# Patient Record
Sex: MALE | Race: WHITE
[De-identification: names, ages, dates, MRNs, and addresses within clinical notes are randomized per-mention and may not be internally consistent; named-entity substitution may affect disease eponyms.]

---

## 2017-05-01 ENCOUNTER — HOSPITAL ENCOUNTER (EMERGENCY)
Dept: HOSPITAL 25 - ED | Age: 32
Discharge: HOME | End: 2017-05-01
Payer: COMMERCIAL

## 2017-05-01 VITALS — DIASTOLIC BLOOD PRESSURE: 64 MMHG | SYSTOLIC BLOOD PRESSURE: 106 MMHG
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
## 2017-05-01 DIAGNOSIS — J02.9: ICD-10-CM

## 2017-05-01 DIAGNOSIS — R06.2: ICD-10-CM

## 2017-05-01 DIAGNOSIS — M54.2: ICD-10-CM

## 2017-05-01 DIAGNOSIS — R55: ICD-10-CM

## 2017-05-01 DIAGNOSIS — S09.90XA: Primary | ICD-10-CM
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
## 2017-05-01 DIAGNOSIS — R51: ICD-10-CM

## 2017-05-01 DIAGNOSIS — J32.9: ICD-10-CM
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
## 2017-05-01 LAB
ALBUMIN SERPL BCG-MCNC: 4.2 G/DL (ref 3.2–5.2)
ALP SERPL-CCNC: 83 U/L (ref 34–104)
ALT SERPL W P-5'-P-CCNC: 41 U/L (ref 7–52)
ANION GAP SERPL CALC-SCNC: 4 MMOL/L (ref 2–11)
AST SERPL-CCNC: 27 U/L (ref 13–39)
BUN SERPL-MCNC: 16 MG/DL (ref 6–24)
BUN/CREAT SERPL: 16.7 (ref 8–20)
CALCIUM SERPL-MCNC: 9.7 MG/DL (ref 8.6–10.3)
CHLORIDE SERPL-SCNC: 102 MMOL/L (ref 101–111)
GLOBULIN SER CALC-MCNC: 2.8 G/DL (ref 2–4)
GLUCOSE SERPL-MCNC: 105 MG/DL (ref 70–100)
HCO3 SERPL-SCNC: 29 MMOL/L (ref 22–32)
HCT VFR BLD AUTO: 45 % (ref 42–52)
HGB BLD-MCNC: 14.8 G/DL (ref 14–18)
LIPASE SERPL-CCNC: < 10 U/L (ref 11–82)
MAGNESIUM SERPL-MCNC: 2 MG/DL (ref 1.9–2.7)
MCH RBC QN AUTO: 33 PG (ref 27–31)
MCHC RBC AUTO-ENTMCNC: 33 G/DL (ref 31–36)
MCV RBC AUTO: 100 FL (ref 80–94)
POTASSIUM SERPL-SCNC: 4.4 MMOL/L (ref 3.5–5)
PROT SERPL-MCNC: 7 G/DL (ref 6.4–8.9)
RBC # BLD AUTO: 4.45 10^6/UL (ref 4–5.4)
SODIUM SERPL-SCNC: 135 MMOL/L (ref 133–145)
TSH SERPL-ACNC: 0.98 MCIU/ML (ref 0.34–5.6)
WBC # BLD AUTO: 9.7 10^3/UL (ref 3.5–10.8)

## 2017-05-01 PROCEDURE — 72125 CT NECK SPINE W/O DYE: CPT

## 2017-05-01 PROCEDURE — 85730 THROMBOPLASTIN TIME PARTIAL: CPT

## 2017-05-01 PROCEDURE — 83690 ASSAY OF LIPASE: CPT

## 2017-05-01 PROCEDURE — 84443 ASSAY THYROID STIM HORMONE: CPT

## 2017-05-01 PROCEDURE — 70450 CT HEAD/BRAIN W/O DYE: CPT

## 2017-05-01 PROCEDURE — 85610 PROTHROMBIN TIME: CPT

## 2017-05-01 PROCEDURE — 99285 EMERGENCY DEPT VISIT HI MDM: CPT

## 2017-05-01 PROCEDURE — 86140 C-REACTIVE PROTEIN: CPT

## 2017-05-01 PROCEDURE — 85025 COMPLETE CBC W/AUTO DIFF WBC: CPT

## 2017-05-01 PROCEDURE — 83735 ASSAY OF MAGNESIUM: CPT

## 2017-05-01 PROCEDURE — 80053 COMPREHEN METABOLIC PANEL: CPT

## 2017-05-01 PROCEDURE — 36415 COLL VENOUS BLD VENIPUNCTURE: CPT

## 2017-05-01 NOTE — RAD
INDICATION:  Trauma, neck pain.



COMPARISON:  Comparison is made with a prior CT of the cervical spine from April 14, 2011

and a prior MRI of the cervical spine from October 25, 2012.



TECHNIQUE: Contiguous axial sections were obtained from the skull base through the T2

vertebra. Images were reconstructed in the sagittal and coronal planes.



FINDINGS: There is a mild cervical scoliosis convex toward the left side. The vertebra are

otherwise in normal alignment. No prevertebral soft tissue swelling or fracture is seen.



No significant spinal canal narrowing is seen. The intervertebral disc spaces appear

maintained.



IMPRESSION:  NO EVIDENCE FOR FRACTURE OR SUBLUXATION.

## 2017-05-01 NOTE — RAD
INDICATION:  Syncope, head injury.



COMPARISON:  Comparison is made with a prior CT of the brain from September 07, 2012.



TECHNIQUE: Contiguous axial sections of the brain were obtained from the skull base to the

vertex without contrast.



FINDINGS: The ventricles, cisterns and sulci are within normal limits.  No significant

focal abnormality or mass effect is seen. There is no evidence for hemorrhage.



No significant focal osseous abnormality is seen. The visualized portion of the paranasal

sinuses and mastoid air cells appear clear.



IMPRESSION:  NO EVIDENCE FOR ACUTE INTRACRANIAL ABNORMALITY.

## 2017-05-01 NOTE — ED
Sandie HUTCHISON Alok, scribed for Ned Horn MD on 05/01/17 at 0633 .





Syncope/Near Syncope





- HPI Summary


HPI Summary: 


30 y/o male presents to the ED following LOC in the kitchen earlier today, 

falling backwards and hitting head. Pt notes occipital head pain as well as 

neck pain. Pt states that while laying back and relaxing he feels signs of near 

syncope returning. Pt also notes sore throat for the past week worsening today 

as well as wheezing. Pt denies CP or wrist pain. Pt has been fatigued from work 

and a new child at home as well as has a second sick child at home. Pt has NKDA.








- History Of Current Complaint


Chief Complaint: EDSyncope


Time Seen by Provider: 05/01/17 05:55


Hx Obtained From: Patient


Onset/Duration: Sudden Onset


Context: Loss Of Consciousness


Activity At Onset: At Rest


Associated Head Trauma: Yes


Aggravating Factor(s): Nothing


Alleviating Factor(s): Nothing


Associated Signs And Symptoms: Headache, Other - Neck Pain





- Allergies/Home Medications


Allergies/Adverse Reactions: 


 Allergies











Allergy/AdvReac Type Severity Reaction Status Date / Time


 


No Known Allergies Allergy   Verified 11/09/12 17:12














PMH/Surg Hx/FS Hx/Imm Hx


Cardiovascular History: 


   Denies: Hx Pacemaker/ICD


Neurological History: Reports: Other Neuro Impairments/Disorders - Hx of synope 

episodes


Psychiatric History: 


   Denies: Hx Panic Disorder





- Surgical History


Surgery Procedure, Year, and Place: TONSILS


Infectious Disease History: No


Infectious Disease History: 


   Denies: Traveled Outside the [place] in Last 30 Days





- Family History


Known Family History: 


   Negative: Cardiac Disease, Hypertension, Diabetes





- Social History


Occupation: Employed Full-time


Lives: With Family


Substance Use Type: Reports: None





Review of Systems


Positive: Sore Throat


Positive: Other - wheezing


Positive: Other - Neck pain


Positive: Headache, Syncope


All Other Systems Reviewed And Are Negative: Yes





Physical Exam


Triage Information Reviewed: Yes


Vital Signs On Initial Exam: 


 Initial Vitals











Temp Pulse Resp BP Pulse Ox


 


 99.4 F   101   20   106/64   100 


 


 05/01/17 03:20  05/01/17 03:20  05/01/17 03:20  05/01/17 03:20  05/01/17 03:20











Vital Signs Reviewed: Yes


Appearance: Positive: Well-Appearing, No Pain Distress


Skin: Positive: Warm, Skin Color Reflects Adequate Perfusion, Dry


Head/Face: Positive: Other - tender occipital area.


Eyes: Positive: EOMI, JULIEN


ENT: Positive: Pharyngeal erythema


Neck: Positive: Supple, Other: - middle of neck midline posterior tenderness.


Respiratory/Lung Sounds: Positive: Clear to Auscultation, Breath Sounds Present


Cardiovascular: Positive: RRR


Abdomen Description: Positive: Nontender, Soft


Bowel Sounds: Positive: Present


Neurological: Positive: Normal, Sensory/Motor Intact, Alert, Oriented to Person 

Place, Time


Psychiatric: Positive: Affect/Mood Appropriate





Diagnostics





- Vital Signs


 Vital Signs











  Temp Pulse Resp BP Pulse Ox


 


 05/01/17 03:25  99.4 F  101  20  106/64  100


 


 05/01/17 03:20  99.4 F  101  20  106/64  100














- Laboratory


Lab Results: 


 Lab Results











  05/01/17 05/01/17 05/01/17 Range/Units





  07:34 07:34 07:34 


 


WBC  9.7    (3.5-10.8)  10^3/ul


 


RBC  4.45    (4.0-5.4)  10^6/ul


 


Hgb  14.8    (14.0-18.0)  g/dl


 


Hct  45    (42-52)  %


 


MCV  100 H    (80-94)  fL


 


MCH  33 H    (27-31)  pg


 


MCHC  33    (31-36)  g/dl


 


RDW  14    (10.5-15)  %


 


Plt Count  184    (150-450)  10^3/ul


 


MPV  8    (7.4-10.4)  um3


 


Neut % (Auto)  75.8    (38-83)  %


 


Lymph % (Auto)  15.4 L    (25-47)  %


 


Mono % (Auto)  6.9    (1-9)  %


 


Eos % (Auto)  1.0    (0-6)  %


 


Baso % (Auto)  0.9    (0-2)  %


 


Absolute Neuts (auto)  7.3    (1.5-7.7)  10^3/ul


 


Absolute Lymphs (auto)  1.5    (1.0-4.8)  10^3/ul


 


Absolute Monos (auto)  0.7    (0-0.8)  10^3/ul


 


Absolute Eos (auto)  0.1    (0-0.6)  10^3/ul


 


Absolute Basos (auto)  0.1    (0-0.2)  10^3/ul


 


Absolute Nucleated RBC  0    10^3/ul


 


Nucleated RBC %  0    


 


INR (Anticoag Therapy)   0.93   (0.89-1.11)  


 


APTT   31.3   (26.0-36.3)  seconds


 


Sodium    135  (133-145)  mmol/L


 


Potassium    4.4  (3.5-5.0)  mmol/L


 


Chloride    102  (101-111)  mmol/L


 


Carbon Dioxide    29  (22-32)  mmol/L


 


Anion Gap    4  (2-11)  mmol/L


 


BUN    16  (6-24)  mg/dL


 


Creatinine    0.96  (0.67-1.17)  mg/dL


 


Est GFR ( Amer)    116.0  (>60)  


 


Est GFR (Non-Af Amer)    90.2  (>60)  


 


BUN/Creatinine Ratio    16.7  (8-20)  


 


Glucose    105 H  ()  mg/dL


 


Calcium    9.7  (8.6-10.3)  mg/dL


 


Magnesium    2.0  (1.9-2.7)  mg/dL


 


Total Bilirubin    1.30 H  (0.2-1.0)  mg/dL


 


AST    27  (13-39)  U/L


 


ALT    41  (7-52)  U/L


 


Alkaline Phosphatase    83  ()  U/L


 


C-Reactive Protein    7.27 H  (< 5.00)  mg/L


 


Total Protein    7.0  (6.4-8.9)  g/dL


 


Albumin    4.2  (3.2-5.2)  g/dL


 


Globulin    2.8  (2-4)  g/dL


 


Albumin/Globulin Ratio    1.5  (1-3)  


 


Lipase    < 10 L  (11.0-82.0)  U/L


 


TSH    0.98  (0.34-5.60)  mcIU/mL











Result Diagrams: 


 05/01/17 07:34





 05/01/17 07:34


Lab Statement: Any lab studies that have been ordered have been reviewed, and 

results considered in the medical decision making process.





Course/Dx


Course Of Treatment: NO CRITICAL CARE TIME.


Assessment/Plan: DISCHARGE HOME STABLE





- Diagnoses


Provider Diagnoses: 


 Sinusitis, Syncope, Head injury








Discharge





- Discharge Plan


Condition: Stable


Disposition: HOME


Prescriptions: 


Amoxicillin/Clavulanate TAB* [Augmentin *] 875 mg PO BID #20 tab


Patient Education Materials:  Sinusitis (ED), Syncope (ED), Head Injury (ED)


Referrals: 


Vidal Ball MD [Primary Care Provider] - 


Additional Instructions: 


FOLLOW UP WITH YOUR DOCTOR.


RETURN TO THE EMERGENCY DEPARTMENT FOR ANY WORSENING OF YOUR CONDITION OR 

QUESTIONS OR CONCERNS.





The documentation as recorded by the Sandie ford Alok accurately reflects 

the service I personally performed and the decisions made by me, Ned Horn MD.

## 2018-02-27 ENCOUNTER — HOSPITAL ENCOUNTER (EMERGENCY)
Dept: HOSPITAL 25 - UCEAST | Age: 33
Discharge: HOME | End: 2018-02-27
Payer: COMMERCIAL

## 2018-02-27 VITALS — SYSTOLIC BLOOD PRESSURE: 112 MMHG | DIASTOLIC BLOOD PRESSURE: 67 MMHG

## 2018-02-27 DIAGNOSIS — J20.9: Primary | ICD-10-CM

## 2018-02-27 DIAGNOSIS — J02.9: ICD-10-CM

## 2018-02-27 DIAGNOSIS — R50.9: ICD-10-CM

## 2018-02-27 PROCEDURE — 87502 INFLUENZA DNA AMP PROBE: CPT

## 2018-02-27 PROCEDURE — 87651 STREP A DNA AMP PROBE: CPT

## 2018-02-27 PROCEDURE — 99212 OFFICE O/P EST SF 10 MIN: CPT

## 2018-02-27 PROCEDURE — G0463 HOSPITAL OUTPT CLINIC VISIT: HCPCS

## 2018-02-27 NOTE — UC
Throat Pain/Nasal Kole HPI





- HPI Summary


HPI Summary: 





31 y/o male presents to the urgent care c/o sore throat for the past 2 days. Pt 

reports he has a cough a productive cough for the past week that has worsen w/ 

days. Now producing a yellowish sputum. Sore throat is 7/10 w/ swallowing and 

associated w/ fever and chills since yesterday. He has taking Ibuprofen 800mg 

PO this morning  to alleviate symptoms. PT denies SOB, chest pain abdominal pain

, N/V/D





- History of Current Complaint


Chief Complaint: UCRespiratory


Stated Complaint: SORE THROAT


Time Seen by Provider: 02/27/18 20:21


Hx Obtained From: Patient


Onset/Duration: Gradual Onset, Lasting Weeks - 1 week, Still Present, Worse 

Since - yesterday


Severity: Moderate


Pain Intensity: 7


Pain Scale Used: 0-10 Numeric


Cough: Sputum Appears - yellowish


Associated Signs & Symptoms: Positive: Dysphagia, Nasal Discharge, Fever





- Epiglottits Risk Factors


Epiglottis Risk Factors: Negative





- Allergies/Home Medications


Allergies/Adverse Reactions: 


 Allergies











Allergy/AdvReac Type Severity Reaction Status Date / Time


 


No Known Allergies Allergy   Verified 11/09/12 17:12











Home Medications: 


 Home Medications





Buprenorphine HCl/Naloxone HCl [Suboxone 8 mg-2 mg Sl Film]  02/27/18 [History]











PMH/Surg Hx/FS Hx/Imm Hx


Previously Healthy: Yes - Pt denies PMHX





- Surgical History


Surgical History: None


Surgery Procedure, Year, and Place: TONSILS





- Family History


Known Family History: Positive: None - Pt denies FMHX


   Negative: Cardiac Disease, Hypertension, Diabetes





- Social History


Occupation: Employed Full-time


Lives: With Family


Alcohol Use: None


Substance Use Type: None


Smoking Status (MU): Never Smoked Tobacco





Review of Systems


Constitutional: Fever, Chills, Fatigue


Skin: Negative


Eyes: Negative


ENT: Sore Throat, Nasal Discharge


Respiratory: Cough


Cardiovascular: Negative


Gastrointestinal: Negative


Genitourinary: Negative


Motor: Negative


Neurovascular: Negative


Musculoskeletal: Negative


Neurological: Negative


Psychological: Negative


Is Patient Immunocompromised?: No


All Other Systems Reviewed And Are Negative: Yes





Physical Exam


Triage Information Reviewed: Yes


Vital Signs: 


 Initial Vital Signs











Temp  100.0 F   02/27/18 20:04


 


Pulse  95   02/27/18 20:04


 


Resp  16   02/27/18 20:04


 


BP  112/67   02/27/18 20:04


 


Pulse Ox  98   02/27/18 20:04














- Additional Comments





VITAL SIGNS: Reviewed. 


GENERAL:  Patient is a well developed and nourished male  who is sitting 

comfortable in the examining table.  Patient is not in any acute respiratory 

distress. 


HEAD AND FACE: No signs of trauma.  No ecchymosis, hematomas or skull 

depressions. No sinus tenderness. edematous erythematous nasal mucosa with 

yellowish discharge, 


EYES: PERRLA, EOMI x 2, No injected conjunctiva, clear watery eyes, no 

nystagmus. No photophobia.


EARS: Hearing grossly intact. Ear canals and tympanic membranes are within 

normal limits. 


MOUTH: Positive pharynx with erythema, no exudates,no  palatal petechiae. no B/

L tonsillar enlargement  Uvula in midline. 


NECK: Supple, trachea is midline, Positive anterior cervical lymphadenopathy, 

no JVD, no carotid bruit, no c-spine tenderness, neck with full ROM. No 

meningeal signs, no Kernig's or brudzinskis signs. 


CHEST: Symmetric, no tenderness at palpation 


LUNGS: breath sounds present  to auscultation bilaterally.  B/L posterior upper 

lungs w/ mild rhonchi,  No wheezing or crackles.


CVS: Regular rate and rhythm, S1 and S2 present, no murmurs or gallops 

appreciated. 


ABDOMEN: Soft, non-tender. No signs of distention. No rebound no guarding, and 

no masses palpated. Bowel sounds are normal. 


EXTREMITIES: FROM in all major joints, no edema, no cyanosis or clubbing.


NEURO: Alert and oriented x 3. No acute neurological deficits. Speech is normal 

and follows commands. 


SKIN: Dry and warm 














Throat Pain/Nasal Course/Dx





- Course


Course Of Treatment: 31 y/o male presents to the urgent care c/o sore throat 

for the past 2 days. Pt reports he has a cough a productive cough for the past 

week that has worsen w/ days. Now producing a yellowish sputum. Sore throat is 7

/10 w/ swallowing and associated w/ fever and chills since yesterday. He has 

taking Ibuprofen 800mg PO this morning  to alleviate symptoms. PT denies SOB, 

chest pain abdominal pain, N/V/D. Hx obtained. Pt with pharyngitis and B/L 

upper posterior lungs w/ mild rhonchi on examination. Rapid strep ordered, 

result: negative.Influenza A&B ordered: result: negative.Pt Rx Z-dara and 

ibuprofen PO to alleviates symptoms. Advised on hand washing. Pt advised to rest

, increase fluid intake, eat well and avoid strenuous exercise. If symptoms do 

not improve or worsen advised to return to the urgent care or f/u with her PCP 

for further evaluation and treatment. Pt understood and agreed with plan of 

care.





- Differential Dx/Diagnosis


Differential Diagnosis/HQI/PQRI: Influenza, Laryngitis, Mononucleosis, 

Pharyngitis, Sinusitis, Tonsillitis, URI, Other - bronchitis


Provider Diagnoses: 1-Acute bronchitis.  2-pharyngitis.  3- fever





Discharge





- Discharge Plan


Condition: Stable


Disposition: HOME


Prescriptions: 


Azithromycin TAB* [Zithromax TAB (Z-DARA) 250 mg #6 tabs] 250 mg PO DAILY #4 tab


Benzonatate CAP* [Tessalon 100 MG CAP*] 100 mg PO TID PRN #21 cap


 PRN Reason: Cough


Ibuprofen TAB* [Motrin TAB* 800 MG] 800 mg PO Q6H PRN #30 tab


 PRN Reason: Fever


Patient Education Materials:  Acute Bronchitis (ED)


Referrals: 


Neil Doshi MD [Primary Care Provider] - 1 Week


Additional Instructions: 


1-Please take full course of antibiotic to avoid resistance. First dose given 

at the clinic today


2-Take Tessalon PO tabs as directed  to alleviate cough. Increase fluid intake, 

rest and eat well. 


3- If symptoms do not improve or worsen or your develop SOB with fever and 

severe wheezing please go immediately to the ER further evaluation and 

treatment.


4- F/u with your PCP in 1 week if not improvement of symptoms for further 

management .